# Patient Record
Sex: MALE | Race: WHITE | URBAN - METROPOLITAN AREA
[De-identification: names, ages, dates, MRNs, and addresses within clinical notes are randomized per-mention and may not be internally consistent; named-entity substitution may affect disease eponyms.]

---

## 2017-12-04 ENCOUNTER — HOSPITAL ENCOUNTER (OUTPATIENT)
Dept: SURGERY | Age: 72
Discharge: HOME | End: 2017-12-05
Attending: OPHTHALMOLOGY | Admitting: OPHTHALMOLOGY

## 2018-01-11 ENCOUNTER — HOSPITAL ENCOUNTER (OUTPATIENT)
Dept: SURGERY | Age: 73
Discharge: HOME | End: 2018-01-11
Admitting: OPHTHALMOLOGY

## 2018-01-11 VITALS
RESPIRATION RATE: 16 BRPM | HEART RATE: 55 BPM | TEMPERATURE: 97.6 F | DIASTOLIC BLOOD PRESSURE: 72 MMHG | SYSTOLIC BLOOD PRESSURE: 154 MMHG | OXYGEN SATURATION: 96 %

## 2018-01-11 PROCEDURE — 93010 ELECTROCARDIOGRAM REPORT: CPT | Performed by: INTERNAL MEDICINE

## 2018-01-11 RX ORDER — SODIUM CHLORIDE 0.9 % (FLUSH) 0.9 %
10 SYRINGE (ML) INJECTION PRN
Status: DISCONTINUED | OUTPATIENT
Start: 2018-01-11 | End: 2018-01-12 | Stop reason: HOSPADM

## 2018-01-11 RX ORDER — SODIUM CHLORIDE 0.9 % (FLUSH) 0.9 %
10 SYRINGE (ML) INJECTION EVERY 12 HOURS SCHEDULED
Status: DISCONTINUED | OUTPATIENT
Start: 2018-01-11 | End: 2018-01-12 | Stop reason: HOSPADM

## 2018-01-11 RX ORDER — ONDANSETRON 2 MG/ML
4 INJECTION INTRAMUSCULAR; INTRAVENOUS
Status: ACTIVE | OUTPATIENT
Start: 2018-01-11 | End: 2018-01-11

## 2018-01-11 RX ORDER — SODIUM CHLORIDE 9 MG/ML
INJECTION, SOLUTION INTRAVENOUS CONTINUOUS
Status: DISCONTINUED | OUTPATIENT
Start: 2018-01-11 | End: 2018-01-12 | Stop reason: HOSPADM

## 2018-01-11 RX ORDER — LABETALOL HYDROCHLORIDE 5 MG/ML
5 INJECTION, SOLUTION INTRAVENOUS EVERY 10 MIN PRN
Status: DISCONTINUED | OUTPATIENT
Start: 2018-01-11 | End: 2018-01-12 | Stop reason: HOSPADM

## 2018-01-11 RX ORDER — PROMETHAZINE HYDROCHLORIDE 25 MG/ML
6.25 INJECTION, SOLUTION INTRAMUSCULAR; INTRAVENOUS
Status: ACTIVE | OUTPATIENT
Start: 2018-01-11 | End: 2018-01-11

## 2018-01-11 RX ADMIN — SODIUM CHLORIDE: 9 INJECTION, SOLUTION INTRAVENOUS at 06:58

## 2018-01-11 ASSESSMENT — PAIN - FUNCTIONAL ASSESSMENT: PAIN_FUNCTIONAL_ASSESSMENT: 0-10

## 2018-01-11 ASSESSMENT — PAIN SCALES - GENERAL
PAINLEVEL_OUTOF10: 0
PAINLEVEL_OUTOF10: 0

## 2018-01-11 NOTE — H&P
Date of Surgery Update:  Darnell Peterson was seen, history and physical examination reviewed, and patient examined by me today.  There have been no significant clinical changes since the completion of the previous history and physical.    Electronically signed by: Erlinda Dietz MD,1/11/2018,7:28 AM

## 2018-01-11 NOTE — OP NOTE
[place] [address]  [phone]      1/11/2018    Patient name: Aisha Starr  YOB: 1945  MRN: [id number]      Pre-operative diagnosis:  1. Brow ptosis, OU  2. Lower lid involutional ectropion, OU    Post-operative diagnosis: Same    Procedure Performed:     1. Eyebrow ptosis repair, OU  2. Lateral tarsal strip, OU      Anesthesia: MAC anesthesia    Indications for Procedure: This pleasant 71y.o. year-old Female presented to our clinic complaining of difficulty seeing in their superior and peripheral fields of gaze as well as eye irritation. Examination revealed visually significantbrow ptosis and lower lid ectropion  . The procedure, risks, alternatives and benefits were explained to them on their  preoperative visit and they provided informed consent. Introduction:   The patient was greeted in the preoperative area. Surgical markings were made. Questions were answered and the procedure was re-explained to the patient. The patient was brought to the operating room, positioned on a gurney, padded and a timeout was performed. MAC /General anesthesia was initiated. Lidocaine 2% with epinephrine mixed in a 1:1 ratio with 0.75% Marcaine was used to infiltrate the operative sites. The patient was then prepped and draped in the usual sterile fashion for oculoplastic surgery. Description of Procedure: Attention was turned to the patient's brow ptosis. An elliptical incision centered on a brow furrow was made. The skin and subcutaneous fat were removed using pickups and monopolar cautery taking care to remain superficial to avoid the facial nerve. The edges of the wound were then undermined using pickups and monopolar cautery. Again, hemostasis was achieved with bipolar cautery.   The deep tissues were closed using buried interrupted passes of 4-0 Monocryl suture and the skin was closed using running passes of 5-0 Monocryl suture. Attention was then turned to the patients lower lid ectropion. A 15-blade was used to make a small lateral canthotomy. Helders scissors were used to complete a lateral canthotomy and inferior cantholysis. Next, a small amount of the anterior lash line and marginal mucosa were removed. In this manner, a tarsal strip was formed. Hemostasis was achieved with bipolar cautery. A 4-0 vicryl suture on a P2 needle was then passed through the fashioned tarsal strip then through the dense periosteum of the lateral orbital rim. A second suture was passed in similar fashion and in this manner, an ectropion repair with tarsal wedge was performed. The lateral canthotomy was then closed using 3 interrupted passes of 6-0 plain gun suture.     Complications:  None    Estimated Blood Loss: Minimal    Specimens removed: None     Implants: none       Electronically signed by: Ana Rodas MD, 1/11/2018, 7:32 AM

## 2018-01-11 NOTE — ANESTHESIA PRE-OP
[facility] Department of Anesthesiology  Pre-Anesthesia Evaluation/Consultation       Name:  Alejandra Rivera  : 1945  Age:  71 y.o. MRN:  [id number]  Date: 2018           Procedure (Scheduled):  Bilateral lower lid extropion, bilateral upper lid browplasty  Surgeon:  Dr. Nahed Beck     Allergies   Allergen Reactions    Lisinopril      cough    Simvastatin Other (See Comments)     edema     There is no problem list on file for this patient. Past Medical History:   Diagnosis Date    Hyperlipidemia     Hypertension     Pneumonia 2017    with viral infection     Past Surgical History:   Procedure Laterality Date    COLONOSCOPY      CORONARY ANGIOPLASTY WITH STENT PLACEMENT      2 heart stents    HERNIA REPAIR      hiatal hernia, valve repair     Social History   Substance Use Topics    Smoking status: Never Smoker    Smokeless tobacco: Never Used    Alcohol use No     Medications  Current Outpatient Prescriptions on File Prior to Encounter   Medication Sig Dispense Refill    losartan (COZAAR) 100 MG tablet Take 100 mg by mouth daily      metoprolol tartrate (LOPRESSOR) 50 MG tablet Take 50 mg by mouth 2 times daily      vitamin D (CHOLECALCIFEROL) 1000 units TABS tablet Take 1,000 Units by mouth daily      atorvastatin (LIPITOR) 80 MG tablet Take 80 mg by mouth daily      hydrochlorothiazide (HYDRODIURIL) 25 MG tablet Take 25 mg by mouth daily      ergocalciferol (ERGOCALCIFEROL) 89727 units capsule Take 50,000 Units by mouth once a week      finasteride (PROSCAR) 5 MG tablet Take 5 mg by mouth daily      doxazosin (CARDURA) 8 MG tablet Take 8 mg by mouth nightly      cycloSPORINE (RESTASIS) 0.05 % ophthalmic emulsion Place 1 drop into both eyes 2 times daily      nitroGLYCERIN (NITROSTAT) 0.4 MG SL tablet Place 0.4 mg under the tongue every 5 minutes as needed for Chest pain up to max of 3 total doses.  If no relief after 1 Intravenous PRN Nathalie Elizondo MD         Vital Signs (Current)   Vitals:    18   BP:    Pulse: 53   Resp:    Temp:    SpO2:      Vital Signs Statistics (for past 48 hrs)     Temp  Av.6 °F (36.4 °C)  Min: 97.6 °F (36.4 °C)   Min taken time: 18  Max: 97.6 °F (36.4 °C)   Max taken time: 18  Pulse  Av.5  Min: 48   Min taken time: 18  Max: 48   Max taken time: 18  Resp  Av  Min: 16   Min taken time: 18  Max: 16   Max taken time: 18  BP  Min: 166/78   Min taken time: 18  Max: 166/78   Max taken time: 18  SpO2  Av %  Min: 97 %   Min taken time: 18  Max: 97 %   Max taken time: 18    BP Readings from Last 3 Encounters:   18 (!) 166/78     BMI  There is no height or weight on file to calculate BMI. Estimated body mass index is 26.5 kg/m² as calculated from the following:    Height as of 18: 5' 11\" (1.803 m). Weight as of 18: 190 lb (86.2 kg). CBC No results found for: WBC, RBC, HGB, HCT, MCV, RDW, PLT  CMP  No results found for: NA, K, CL, CO2, BUN, CREATININE, GFRAA, AGRATIO, LABGLOM, GLUCOSE, PROT, CALCIUM, BILITOT, ALKPHOS, AST, ALT  BMP  No results found for: NA, K, CL, CO2, BUN, CREATININE, CALCIUM, GFRAA, LABGLOM, GLUCOSE  POCGlucose  No results for input(s): GLUCOSE in the last 72 hours.    Coags  No results found for: PROTIME, INR, APTT  HCG (If Applicable) No results found for: PREGTESTUR, PREGSERUM, HCG, HCGQUANT   ABGs No results found for: PHART, PO2ART, GEY7RFA, OQF9SBV, BEART, [id number]   Type & Screen (If Applicable)  No results found for: LABABO, LABRH                         BMI: Wt Readings from Last 3 Encounters:       NPO Status:   Date of last liquid consumption: 01/10/18   Time of last liquid consumption: 1700   Date of last solid food consumption: 01/10/18      Time of last solid consumption: 1700       Anesthesia Evaluation  Patient summary reviewed no history of anesthetic complications:   Airway: Mallampati: II  TM distance: >3 FB   Neck ROM: full   Dental:    (+) partials      Pulmonary:normal exam        (-) pneumonia                           Cardiovascular:  Exercise tolerance: good (>4 METS),   (+) hypertension:, CAD:, CABG/stent (Stents x2 2008, no current issues per pt):, dysrhythmias (RBBB):,       ECG reviewed  Rhythm: regular  Rate: normal           Beta Blocker:  Dose within 24 Hrs         Neuro/Psych:   Negative Neuro/Psych ROS              GI/Hepatic/Renal: Neg GI/Hepatic/Renal ROS            Endo/Other: Negative Endo/Other ROS                    Abdominal:           Vascular: negative vascular ROS. Anesthesia Plan      MAC     ASA 3       Induction: intravenous. MIPS: Postoperative opioids intended and Prophylactic antiemetics administered. Anesthetic plan and risks discussed with patient. Plan discussed with CRNA. This pre-anesthesia assessment may be used as a history and physical.    DOS STAFF ADDENDUM:    Pt seen and examined, chart reviewed (including anesthesia, drug and allergy history). No interval changes to history and physical examination. Anesthetic plan, risks, benefits, alternatives, and personnel involved discussed with patient. Patient verbalized an understanding and agrees to proceed.       Shobha Bar MD  January 11, 2018  7:00 AM

## 2018-01-12 LAB
EKG ATRIAL RATE: 49 BPM
EKG DIAGNOSIS: NORMAL
EKG P AXIS: 51 DEGREES
EKG P-R INTERVAL: 148 MS
EKG Q-T INTERVAL: 496 MS
EKG QRS DURATION: 140 MS
EKG QTC CALCULATION (BAZETT): 448 MS
EKG R AXIS: -29 DEGREES
EKG T AXIS: -18 DEGREES
EKG VENTRICULAR RATE: 49 BPM

## 2018-04-26 ENCOUNTER — HOSPITAL ENCOUNTER (OUTPATIENT)
Dept: SURGERY | Age: 73
Discharge: HOME | End: 2018-04-26
Attending: OPHTHALMOLOGY | Admitting: OPHTHALMOLOGY

## 2018-04-26 VITALS
DIASTOLIC BLOOD PRESSURE: 81 MMHG | HEIGHT: 71 IN | OXYGEN SATURATION: 97 % | BODY MASS INDEX: 27.3 KG/M2 | HEART RATE: 60 BPM | RESPIRATION RATE: 13 BRPM | SYSTOLIC BLOOD PRESSURE: 155 MMHG | TEMPERATURE: 97.4 F | WEIGHT: 195 LBS

## 2018-04-26 DIAGNOSIS — H02.831 DERMATOCHALASIS OF RIGHT UPPER EYELID: ICD-10-CM

## 2018-04-26 RX ORDER — SODIUM CHLORIDE 0.9 % (FLUSH) 0.9 %
10 SYRINGE (ML) INJECTION EVERY 12 HOURS SCHEDULED
Status: DISCONTINUED | OUTPATIENT
Start: 2018-04-26 | End: 2018-04-27 | Stop reason: HOSPADM

## 2018-04-26 RX ORDER — SODIUM CHLORIDE 9 MG/ML
INJECTION, SOLUTION INTRAVENOUS CONTINUOUS
Status: DISCONTINUED | OUTPATIENT
Start: 2018-04-26 | End: 2018-04-27 | Stop reason: HOSPADM

## 2018-04-26 RX ORDER — SODIUM CHLORIDE 0.9 % (FLUSH) 0.9 %
10 SYRINGE (ML) INJECTION PRN
Status: DISCONTINUED | OUTPATIENT
Start: 2018-04-26 | End: 2018-04-26 | Stop reason: SDUPTHER

## 2018-04-26 RX ORDER — SODIUM CHLORIDE 0.9 % (FLUSH) 0.9 %
10 SYRINGE (ML) INJECTION PRN
Status: DISCONTINUED | OUTPATIENT
Start: 2018-04-26 | End: 2018-04-27 | Stop reason: HOSPADM

## 2018-04-26 RX ORDER — ONDANSETRON 2 MG/ML
4 INJECTION INTRAMUSCULAR; INTRAVENOUS
Status: ACTIVE | OUTPATIENT
Start: 2018-04-26 | End: 2018-04-26

## 2018-04-26 RX ORDER — SODIUM CHLORIDE 0.9 % (FLUSH) 0.9 %
10 SYRINGE (ML) INJECTION EVERY 12 HOURS SCHEDULED
Status: DISCONTINUED | OUTPATIENT
Start: 2018-04-26 | End: 2018-04-26 | Stop reason: SDUPTHER

## 2018-04-26 RX ADMIN — SODIUM CHLORIDE: 9 INJECTION, SOLUTION INTRAVENOUS at 08:23

## 2018-04-26 ASSESSMENT — PAIN - FUNCTIONAL ASSESSMENT: PAIN_FUNCTIONAL_ASSESSMENT: 0-10

## 2018-04-26 ASSESSMENT — ENCOUNTER SYMPTOMS: SHORTNESS OF BREATH: 0

## 2018-04-26 ASSESSMENT — LIFESTYLE VARIABLES: SMOKING_STATUS: 0
